# Patient Record
Sex: FEMALE | Race: WHITE | NOT HISPANIC OR LATINO | Employment: OTHER | RURAL
[De-identification: names, ages, dates, MRNs, and addresses within clinical notes are randomized per-mention and may not be internally consistent; named-entity substitution may affect disease eponyms.]

---

## 2024-03-16 ENCOUNTER — OUTSIDE PLACE OF SERVICE (OUTPATIENT)
Dept: ORTHOPEDICS | Facility: CLINIC | Age: 69
End: 2024-03-16
Payer: MEDICARE

## 2024-03-16 PROCEDURE — 25607 OPTX DST RD XARTC FX/EPI SEP: CPT | Mod: RT,,, | Performed by: ORTHOPAEDIC SURGERY

## 2024-03-16 PROCEDURE — 24685 OPTX ULNAR FX PROX END W/FIX: CPT | Mod: 51,RT,, | Performed by: ORTHOPAEDIC SURGERY

## 2024-03-19 ENCOUNTER — OFFICE VISIT (OUTPATIENT)
Dept: ORTHOPEDICS | Facility: CLINIC | Age: 69
End: 2024-03-19
Payer: MEDICARE

## 2024-03-19 DIAGNOSIS — Z09 FOLLOW-UP EXAMINATION, FOLLOWING OTHER SURGERY: Primary | ICD-10-CM

## 2024-03-19 PROCEDURE — 99212 OFFICE O/P EST SF 10 MIN: CPT | Mod: PBBFAC,25 | Performed by: ORTHOPAEDIC SURGERY

## 2024-03-19 PROCEDURE — 29105 APPLICATION LONG ARM SPLINT: CPT | Mod: PBBFAC | Performed by: ORTHOPAEDIC SURGERY

## 2024-03-19 PROCEDURE — 99999PBSHW PR PBB SHADOW TECHNICAL ONLY FILED TO HB: Mod: PBBFAC,,,

## 2024-03-19 PROCEDURE — 99024 POSTOP FOLLOW-UP VISIT: CPT | Mod: ,,, | Performed by: ORTHOPAEDIC SURGERY

## 2024-03-19 PROCEDURE — 29105 APPLICATION LONG ARM SPLINT: CPT | Mod: S$PBB,58,RT, | Performed by: ORTHOPAEDIC SURGERY

## 2024-03-19 RX ORDER — TRAMADOL HYDROCHLORIDE 50 MG/1
50 TABLET ORAL EVERY 6 HOURS PRN
Qty: 20 TABLET | Refills: 0 | Status: ON HOLD | OUTPATIENT
Start: 2024-03-19 | End: 2024-04-10 | Stop reason: HOSPADM

## 2024-03-19 NOTE — PROGRESS NOTES
CC:   Chief Complaint   Patient presents with    Post-op Evaluation     RT WRIST ORIF ELBOW AND WRIST 3/16- NETTA - DRESSING CHANGE         PREVIOUS INFO:  ORIF right olecranon March 16, 2024  ORIF right distal radius March 16, 2024        HISTORY:   3/19/2024    Catalina Pendleton  is a 68 y.o. patient was discharged yesterday from [facility] she underwent ORIF of her olecranon and her wrist on her right arm on the 16th of March think that is we pull the drain she had some drainage in his soaked through the splint on the bottom side she called today so we brought her in to get a change to check her skin she has underlying hepatitis-C history      PAST MEDICAL HISTORY: No past medical history on file.       PAST SURGICAL HISTORY: No past surgical history on file.       ALLERGIES: Review of patient's allergies indicates:  Not on File     MEDICATIONS :  No current outpatient medications on file.     SOCIAL HISTORY:   Social History     Socioeconomic History    Marital status:         ROS    FAMILY HISTORY: No family history on file.       PHYSICAL EXAM: There were no vitals filed for this visit.            There is no height or weight on file to calculate BMI.     In general, this is a well-developed, well-nourished female . The patient is alert, oriented and cooperative.      HEENT:  Normocephalic, atraumatic.  Extraocular movements are intact bilaterally.  The oropharynx is benign.       NECK:  Nontender with good range of motion.      PULMONARY: Respirations are even and non-labored.       CARDIOVASCULAR: Pulses regular by peripheral palpation.       ABDOMEN:  Soft, non-tender, non-distended.        EXTREMITIES:  There was blood-tinged serous fluid on her splints splints removed both incisions look fine she has multiple skin tears lot of swelling with the previous drain is there is blade serous drainage from that also    Ortho Exam      RADIOGRAPHIC FINDINGS:  None  today      .      IMPRESSION:  Lot of swelling and edema skin tears skin is in very poor condition but incisions look good    PLAN:  New dressings new long-arm plaster splint applied I will see her back in a week do a new splint again check on her skin tears also        No follow-ups on file.         Aníbal Fragoso III      (Subject to voice recognition error, transcription service not allowed)

## 2024-03-26 ENCOUNTER — OFFICE VISIT (OUTPATIENT)
Dept: ORTHOPEDICS | Facility: CLINIC | Age: 69
End: 2024-03-26
Payer: MEDICARE

## 2024-03-26 DIAGNOSIS — Z09 FOLLOW-UP EXAMINATION, FOLLOWING OTHER SURGERY: Primary | ICD-10-CM

## 2024-03-26 PROCEDURE — 99024 POSTOP FOLLOW-UP VISIT: CPT | Mod: ,,, | Performed by: ORTHOPAEDIC SURGERY

## 2024-03-26 PROCEDURE — 29105 APPLICATION LONG ARM SPLINT: CPT | Mod: PBBFAC | Performed by: ORTHOPAEDIC SURGERY

## 2024-03-26 PROCEDURE — 29105 APPLICATION LONG ARM SPLINT: CPT | Mod: S$PBB,58,RT, | Performed by: ORTHOPAEDIC SURGERY

## 2024-03-26 PROCEDURE — 99999PBSHW PR PBB SHADOW TECHNICAL ONLY FILED TO HB: Mod: PBBFAC,,,

## 2024-03-26 PROCEDURE — 99212 OFFICE O/P EST SF 10 MIN: CPT | Mod: PBBFAC,25 | Performed by: ORTHOPAEDIC SURGERY

## 2024-03-26 RX ORDER — SULFAMETHOXAZOLE AND TRIMETHOPRIM 800; 160 MG/1; MG/1
1 TABLET ORAL 2 TIMES DAILY
Qty: 14 TABLET | Refills: 0 | Status: SHIPPED | OUTPATIENT
Start: 2024-03-26 | End: 2024-04-02 | Stop reason: SDUPTHER

## 2024-03-26 NOTE — PROGRESS NOTES
CC:   Chief Complaint   Patient presents with    Right Wrist - Post-op Evaluation     RT WRIST AND ELBOW ORIF [place] 3/16 (10 DAYS)    Right Elbow - Post-op Evaluation          PREVIOUS INFO:  ORIF right olecranon March 16, 2024  ORIF right distal radius March 16, 2024  hepatitis-C history        HISTORY:   3/19/2024    Catalina Pendleton  is a 68 y.o. patient was discharged yesterday from [facility] she underwent ORIF of her olecranon and her wrist on her right arm on the 16th of March think that is we pull the drain she had some drainage in his soaked through the splint on the bottom side she called today so we brought her in to get a change to check her skin she has underlying hepatitis-C history         HISTORY:   3/26/2024    Catalina Pendleton  is a 68 y.o. patient comes in follow-up ORIF right olecranon and right wrist volar plate surgery was March 16 she had a lot of swelling lot of weeping she has some skin tears on the volar aspect of her forearm really bone in just for a dressing change she would week through her original dressing we changes on the 19 so she is brought back at a week for dressing change      PAST MEDICAL HISTORY: No past medical history on file.       PAST SURGICAL HISTORY: No past surgical history on file.       ALLERGIES:   Review of patient's allergies indicates:   Allergen Reactions    Latex, natural rubber         MEDICATIONS :    Current Outpatient Medications:     traMADoL (ULTRAM) 50 mg tablet, Take 1 tablet (50 mg total) by mouth every 6 (six) hours as needed for Pain., Disp: 20 tablet, Rfl: 0     SOCIAL HISTORY:   Social History     Socioeconomic History    Marital status:         ROS    FAMILY HISTORY: No family history on file.       PHYSICAL EXAM: There were no vitals filed for this visit.            There is no height or weight on file to calculate BMI.     In general, this is a well-developed, well-nourished female . The patient is alert, oriented and  cooperative.      HEENT:  Normocephalic, atraumatic.  Extraocular movements are intact bilaterally.  The oropharynx is benign.       NECK:  Nontender with good range of motion.      PULMONARY: Respirations are even and non-labored.       CARDIOVASCULAR: Pulses regular by peripheral palpation.       ABDOMEN:  Soft, non-tender, non-distended.        EXTREMITIES:  Dressings are removed splints removed the elbow incision looks excellent she has a skin tear ulna volar aspect of her forearm she does have some swelling about her volar incision on her wrist some erythema present there in addition there is no drainage    Ortho Exam      RADIOGRAPHIC FINDINGS:  None today      .      IMPRESSION:  New sterile dressing new long-arm splint    PLAN:  Long-arm splint out of plaster well-padded sterile dressings  I will place her on Bactrim  And will do a wound check in a week again  Going to place Telfa over her skin tear  There are no Patient Instructions on file for this visit.      No follow-ups on file.         Aníbal Fragoso III      (Subject to voice recognition error, transcription service not allowed)

## 2024-04-01 DIAGNOSIS — M25.531 RIGHT WRIST PAIN: ICD-10-CM

## 2024-04-01 DIAGNOSIS — M25.521 ELBOW PAIN, RIGHT: Primary | ICD-10-CM

## 2024-04-02 ENCOUNTER — OFFICE VISIT (OUTPATIENT)
Dept: ORTHOPEDICS | Facility: CLINIC | Age: 69
End: 2024-04-02
Payer: MEDICARE

## 2024-04-02 ENCOUNTER — HOSPITAL ENCOUNTER (OUTPATIENT)
Dept: RADIOLOGY | Facility: HOSPITAL | Age: 69
Discharge: HOME | End: 2024-04-02
Attending: ORTHOPAEDIC SURGERY
Payer: MEDICARE

## 2024-04-02 DIAGNOSIS — M25.521 ELBOW PAIN, RIGHT: ICD-10-CM

## 2024-04-02 DIAGNOSIS — M25.531 RIGHT WRIST PAIN: ICD-10-CM

## 2024-04-02 DIAGNOSIS — Z09 FOLLOW-UP EXAMINATION, FOLLOWING OTHER SURGERY: Primary | ICD-10-CM

## 2024-04-02 PROCEDURE — 29105 APPLICATION LONG ARM SPLINT: CPT | Mod: PBBFAC | Performed by: ORTHOPAEDIC SURGERY

## 2024-04-02 PROCEDURE — 73110 X-RAY EXAM OF WRIST: CPT | Mod: 26,RT,, | Performed by: ORTHOPAEDIC SURGERY

## 2024-04-02 PROCEDURE — 99212 OFFICE O/P EST SF 10 MIN: CPT | Mod: PBBFAC,25 | Performed by: ORTHOPAEDIC SURGERY

## 2024-04-02 PROCEDURE — 29105 APPLICATION LONG ARM SPLINT: CPT | Mod: S$PBB,58,RT, | Performed by: ORTHOPAEDIC SURGERY

## 2024-04-02 PROCEDURE — 99024 POSTOP FOLLOW-UP VISIT: CPT | Mod: ,,, | Performed by: ORTHOPAEDIC SURGERY

## 2024-04-02 PROCEDURE — 73110 X-RAY EXAM OF WRIST: CPT | Mod: TC,RT

## 2024-04-02 PROCEDURE — 73070 X-RAY EXAM OF ELBOW: CPT | Mod: 26,RT,, | Performed by: ORTHOPAEDIC SURGERY

## 2024-04-02 PROCEDURE — 99999PBSHW PR PBB SHADOW TECHNICAL ONLY FILED TO HB: Mod: PBBFAC,,,

## 2024-04-02 PROCEDURE — 73070 X-RAY EXAM OF ELBOW: CPT | Mod: TC,RT

## 2024-04-02 RX ORDER — SULFAMETHOXAZOLE AND TRIMETHOPRIM 800; 160 MG/1; MG/1
1 TABLET ORAL 2 TIMES DAILY
Qty: 14 TABLET | Refills: 0 | Status: ON HOLD | OUTPATIENT
Start: 2024-04-02 | End: 2024-04-10 | Stop reason: HOSPADM

## 2024-04-09 ENCOUNTER — OFFICE VISIT (OUTPATIENT)
Dept: ORTHOPEDICS | Facility: CLINIC | Age: 69
End: 2024-04-09
Payer: MEDICARE

## 2024-04-09 ENCOUNTER — HOSPITAL ENCOUNTER (OUTPATIENT)
Dept: RADIOLOGY | Facility: HOSPITAL | Age: 69
Discharge: HOME | End: 2024-04-09
Attending: ORTHOPAEDIC SURGERY
Payer: MEDICARE

## 2024-04-09 DIAGNOSIS — Z01.812 PRE-OPERATIVE LABORATORY EXAMINATION: ICD-10-CM

## 2024-04-09 DIAGNOSIS — M25.531 RIGHT WRIST PAIN: Primary | ICD-10-CM

## 2024-04-09 DIAGNOSIS — M79.5 FOREIGN BODY (FB) IN SOFT TISSUE: ICD-10-CM

## 2024-04-09 DIAGNOSIS — M25.531 RIGHT WRIST PAIN: ICD-10-CM

## 2024-04-09 PROCEDURE — 1159F MED LIST DOCD IN RCRD: CPT | Mod: CPTII,,, | Performed by: ORTHOPAEDIC SURGERY

## 2024-04-09 PROCEDURE — 73110 X-RAY EXAM OF WRIST: CPT | Mod: 26,RT,, | Performed by: ORTHOPAEDIC SURGERY

## 2024-04-09 PROCEDURE — 99214 OFFICE O/P EST MOD 30 MIN: CPT | Mod: PBBFAC,25 | Performed by: ORTHOPAEDIC SURGERY

## 2024-04-09 PROCEDURE — 99024 POSTOP FOLLOW-UP VISIT: CPT | Mod: ,,, | Performed by: ORTHOPAEDIC SURGERY

## 2024-04-09 PROCEDURE — 73110 X-RAY EXAM OF WRIST: CPT | Mod: TC,RT

## 2024-04-09 NOTE — PATIENT INSTRUCTIONS
Surgery Instructions     Your surgery is scheduled for 4/10/24 at [facility] on the   ground floor of the Ambulatory building. You should arrive at 6:00 AM  at the   Ambulatory Care Center located at [address].    Pre Op Testing: TODAY     _X___ Lab  (1st floor clinic)   ____ EKG  (2nd floor clinic)  ____ Chest xray (Imaging Center)       Our office will contact you the day before surgery with your arrival time.  DO NOT eat or drink anything after midnight the night before surgery (this includes gum, candy, chewing tobacco, etc).  You CAN NOT drive after surgery, please arrange for someone to drive you.  Bring all medication in their original bottles.  Bathe with Hibiclens the night or morning before your surgery.  The morning of your surgery ONLY take blood pressure, heart, acid reflux, or thyroid (if you take a morning dose) medication with a sip of water.   Be sure to have stopped your blood thinner medication at the appropriate time, as instructed.  Bring your C-Pap machine if you have one.  All jewelry, piercings, or false eyelashes MUST be removed prior to surgery.

## 2024-04-09 NOTE — PROGRESS NOTES
CC:   Chief Complaint   Patient presents with    Post-op Evaluation     RT WRIST & ELBOW ORIF 3/16 (3WKS) NETTA        PREVIOUS INFO:  PREVIOUS INFO:  ORIF right olecranon March 16, 2024  ORIF right distal radius March 16, 2024  hepatitis-C history           History:  4/2/2024   Catalina Pendleton is a 68 y.o.  status post 2 weeks out right upper extremity ORIF of the wrist and olecranon surgery was on 03/16/2024 she had all kind of problems with bruising swelling weeping edema she has skin tears also were basically doing wound care.  We did place her on Bactrim last week secondary to really her the volar aspect of the wrist      HISTORY:   4/9/2024    Catalina Pendleton  is a 68 y.o. patient comes in about 3 and half weeks out ORIF of the olecranon and radius.  The read on antibiotics cause the erythema distally she had skin tears of the heel back but she also healed over a couple of her staples at her wrist.  Remainder of the staples were removed but she has to under the skin going to take out in the operating      PAST MEDICAL HISTORY: No past medical history on file.       PAST SURGICAL HISTORY: No past surgical history on file.       ALLERGIES:   Review of patient's allergies indicates:   Allergen Reactions    Latex, natural rubber         MEDICATIONS :    Current Outpatient Medications:     sulfamethoxazole-trimethoprim 800-160mg (BACTRIM DS) 800-160 mg Tab, Take 1 tablet by mouth 2 (two) times daily., Disp: 14 tablet, Rfl: 0    traMADoL (ULTRAM) 50 mg tablet, Take 1 tablet (50 mg total) by mouth every 6 (six) hours as needed for Pain., Disp: 20 tablet, Rfl: 0     SOCIAL HISTORY:   Social History     Socioeconomic History    Marital status:         ROS    FAMILY HISTORY: No family history on file.       PHYSICAL EXAM: There were no vitals filed for this visit.            There is no height or weight on file to calculate BMI.     In general, this is a well-developed, well-nourished female  . The patient is alert, oriented and cooperative.      HEENT:  Normocephalic, atraumatic.  Extraocular movements are intact bilaterally.  The oropharynx is benign.       NECK:  Nontender with good range of motion.      PULMONARY: Respirations are even and non-labored.       CARDIOVASCULAR: Pulses regular by peripheral palpation.       ABDOMEN:  Soft, non-tender, non-distended.        EXTREMITIES:  On exam all the staples to remove remove those to her covered up totally and skin recommend removing those foreign bodies in the operating room    Ortho Exam      RADIOGRAPHIC FINDINGS:  Right Wrist x-rays AP lateral oblique views volar plate is present good alignment distal radius fracture there is 2 staples present remainder of been removed  .      IMPRESSION:  2 staples are totally buried under the skin skin  ( foreign body) is totally grown over and recommended removal in the operating room this point    PLAN:  Removal foreign bodies staples right wrist we will put her back in a long-arm splint then sling today    I had a long discussion with the patient about treatment options, including operative and nonoperative treatments. We discussed pros and cons of each including risks pertinent to surgery including pain, infection, bleeding, damage to adjacent structures like nerves and blood vessels, failure to heal, need for future surgeries, stiffness, instability, loss of limb, anesthesia risks like stroke, blood clot, loss of life. We discussed the possibility of need for later hardware removal in the case that hardware was used. We discussed common and uncommon risks, and discussed patient specific factors that may increase the risks present with surgery. All questions were answered. The patient expressed understanding of the pros and cons of surgery and wanted to proceed with surgical treatment.                 Aníbal Fragoso III      (Subject to voice recognition error, transcription service not allowed)

## 2024-04-10 ENCOUNTER — HOSPITAL ENCOUNTER (OUTPATIENT)
Facility: HOSPITAL | Age: 69
Discharge: HOME | End: 2024-04-10
Attending: ORTHOPAEDIC SURGERY | Admitting: ORTHOPAEDIC SURGERY
Payer: MEDICARE

## 2024-04-10 ENCOUNTER — ANESTHESIA (OUTPATIENT)
Dept: SURGERY | Facility: HOSPITAL | Age: 69
End: 2024-04-10
Payer: MEDICARE

## 2024-04-10 ENCOUNTER — ANESTHESIA EVENT (OUTPATIENT)
Dept: SURGERY | Facility: HOSPITAL | Age: 69
End: 2024-04-10
Payer: MEDICARE

## 2024-04-10 VITALS
WEIGHT: 160 LBS | HEIGHT: 67 IN | SYSTOLIC BLOOD PRESSURE: 138 MMHG | RESPIRATION RATE: 18 BRPM | OXYGEN SATURATION: 94 % | DIASTOLIC BLOOD PRESSURE: 68 MMHG | BODY MASS INDEX: 25.11 KG/M2 | TEMPERATURE: 98 F | HEART RATE: 103 BPM

## 2024-04-10 DIAGNOSIS — M79.5 FOREIGN BODY (FB) IN SOFT TISSUE: Primary | ICD-10-CM

## 2024-04-10 PROCEDURE — 36000706: Performed by: ORTHOPAEDIC SURGERY

## 2024-04-10 PROCEDURE — 25000003 PHARM REV CODE 250: Performed by: NURSE ANESTHETIST, CERTIFIED REGISTERED

## 2024-04-10 PROCEDURE — 27000716 HC OXISENSOR PROBE, ANY SIZE: Performed by: NURSE ANESTHETIST, CERTIFIED REGISTERED

## 2024-04-10 PROCEDURE — 27000510 HC BLANKET BAIR HUGGER ANY SIZE: Performed by: NURSE ANESTHETIST, CERTIFIED REGISTERED

## 2024-04-10 PROCEDURE — 37000008 HC ANESTHESIA 1ST 15 MINUTES: Performed by: ORTHOPAEDIC SURGERY

## 2024-04-10 PROCEDURE — 25000003 PHARM REV CODE 250: Performed by: ORTHOPAEDIC SURGERY

## 2024-04-10 PROCEDURE — 27000655: Performed by: NURSE ANESTHETIST, CERTIFIED REGISTERED

## 2024-04-10 PROCEDURE — 37000009 HC ANESTHESIA EA ADD 15 MINS: Performed by: ORTHOPAEDIC SURGERY

## 2024-04-10 PROCEDURE — 10120 INC&RMVL FB SUBQ TISS SMPL: CPT | Mod: 78,,, | Performed by: ORTHOPAEDIC SURGERY

## 2024-04-10 PROCEDURE — 71000033 HC RECOVERY, INTIAL HOUR: Performed by: ORTHOPAEDIC SURGERY

## 2024-04-10 PROCEDURE — 71000015 HC POSTOP RECOV 1ST HR: Performed by: ORTHOPAEDIC SURGERY

## 2024-04-10 PROCEDURE — D9220A PRA ANESTHESIA: Mod: ANES,,, | Performed by: ANESTHESIOLOGY

## 2024-04-10 PROCEDURE — 63600175 PHARM REV CODE 636 W HCPCS: Performed by: ANESTHESIOLOGY

## 2024-04-10 PROCEDURE — 63600175 PHARM REV CODE 636 W HCPCS: Performed by: NURSE ANESTHETIST, CERTIFIED REGISTERED

## 2024-04-10 PROCEDURE — 36000707: Performed by: ORTHOPAEDIC SURGERY

## 2024-04-10 PROCEDURE — 27000284 HC CANNULA NASAL: Performed by: NURSE ANESTHETIST, CERTIFIED REGISTERED

## 2024-04-10 PROCEDURE — 27000177 HC AIRWAY, LARYNGEAL MASK: Performed by: NURSE ANESTHETIST, CERTIFIED REGISTERED

## 2024-04-10 PROCEDURE — D9220A PRA ANESTHESIA: Mod: CRNA,,, | Performed by: NURSE ANESTHETIST, CERTIFIED REGISTERED

## 2024-04-10 RX ORDER — MONTELUKAST SODIUM 10 MG/1
10 TABLET ORAL 2 TIMES DAILY
COMMUNITY

## 2024-04-10 RX ORDER — MEPERIDINE HYDROCHLORIDE 25 MG/ML
25 INJECTION INTRAMUSCULAR; INTRAVENOUS; SUBCUTANEOUS EVERY 10 MIN PRN
Status: DISCONTINUED | OUTPATIENT
Start: 2024-04-10 | End: 2024-04-10 | Stop reason: HOSPADM

## 2024-04-10 RX ORDER — FLUTICASONE FUROATE AND VILANTEROL TRIFENATATE 200; 25 UG/1; UG/1
1 POWDER RESPIRATORY (INHALATION) DAILY
COMMUNITY

## 2024-04-10 RX ORDER — SODIUM CHLORIDE 9 MG/ML
INJECTION, SOLUTION INTRAVENOUS CONTINUOUS
Status: DISCONTINUED | OUTPATIENT
Start: 2024-04-10 | End: 2024-04-10 | Stop reason: HOSPADM

## 2024-04-10 RX ORDER — DIPHENHYDRAMINE HYDROCHLORIDE 50 MG/ML
25 INJECTION INTRAMUSCULAR; INTRAVENOUS EVERY 6 HOURS PRN
Status: DISCONTINUED | OUTPATIENT
Start: 2024-04-10 | End: 2024-04-10 | Stop reason: HOSPADM

## 2024-04-10 RX ORDER — PROPOFOL 10 MG/ML
INJECTION, EMULSION INTRAVENOUS
Status: DISCONTINUED | OUTPATIENT
Start: 2024-04-10 | End: 2024-04-10

## 2024-04-10 RX ORDER — DEXAMETHASONE SODIUM PHOSPHATE 4 MG/ML
INJECTION, SOLUTION INTRA-ARTICULAR; INTRALESIONAL; INTRAMUSCULAR; INTRAVENOUS; SOFT TISSUE
Status: DISCONTINUED | OUTPATIENT
Start: 2024-04-10 | End: 2024-04-10

## 2024-04-10 RX ORDER — IPRATROPIUM BROMIDE AND ALBUTEROL SULFATE 2.5; .5 MG/3ML; MG/3ML
3 SOLUTION RESPIRATORY (INHALATION) ONCE
Status: DISCONTINUED | OUTPATIENT
Start: 2024-04-10 | End: 2024-04-10 | Stop reason: HOSPADM

## 2024-04-10 RX ORDER — ONDANSETRON 4 MG/1
8 TABLET, ORALLY DISINTEGRATING ORAL EVERY 8 HOURS PRN
Status: DISCONTINUED | OUTPATIENT
Start: 2024-04-10 | End: 2024-04-10 | Stop reason: HOSPADM

## 2024-04-10 RX ORDER — TRAMADOL HYDROCHLORIDE 50 MG/1
50 TABLET ORAL EVERY 6 HOURS PRN
Qty: 12 TABLET | Refills: 0 | Status: SHIPPED | OUTPATIENT
Start: 2024-04-10

## 2024-04-10 RX ORDER — HYDROMORPHONE HYDROCHLORIDE 2 MG/ML
0.5 INJECTION, SOLUTION INTRAMUSCULAR; INTRAVENOUS; SUBCUTANEOUS EVERY 5 MIN PRN
Status: DISCONTINUED | OUTPATIENT
Start: 2024-04-10 | End: 2024-04-10 | Stop reason: HOSPADM

## 2024-04-10 RX ORDER — OMEPRAZOLE 40 MG/1
40 CAPSULE, DELAYED RELEASE ORAL DAILY
COMMUNITY
Start: 2024-01-31

## 2024-04-10 RX ORDER — MORPHINE SULFATE 10 MG/ML
4 INJECTION INTRAMUSCULAR; INTRAVENOUS; SUBCUTANEOUS EVERY 5 MIN PRN
Status: DISCONTINUED | OUTPATIENT
Start: 2024-04-10 | End: 2024-04-10 | Stop reason: HOSPADM

## 2024-04-10 RX ORDER — TRAMADOL HYDROCHLORIDE 50 MG/1
50 TABLET ORAL EVERY 4 HOURS PRN
Status: DISCONTINUED | OUTPATIENT
Start: 2024-04-10 | End: 2024-04-10 | Stop reason: HOSPADM

## 2024-04-10 RX ORDER — ONDANSETRON HYDROCHLORIDE 2 MG/ML
INJECTION, SOLUTION INTRAVENOUS
Status: DISCONTINUED | OUTPATIENT
Start: 2024-04-10 | End: 2024-04-10

## 2024-04-10 RX ORDER — ONDANSETRON HYDROCHLORIDE 2 MG/ML
4 INJECTION, SOLUTION INTRAVENOUS DAILY PRN
Status: DISCONTINUED | OUTPATIENT
Start: 2024-04-10 | End: 2024-04-10 | Stop reason: HOSPADM

## 2024-04-10 RX ORDER — LIDOCAINE HYDROCHLORIDE 20 MG/ML
INJECTION, SOLUTION EPIDURAL; INFILTRATION; INTRACAUDAL; PERINEURAL
Status: DISCONTINUED | OUTPATIENT
Start: 2024-04-10 | End: 2024-04-10

## 2024-04-10 RX ORDER — FENTANYL CITRATE 50 UG/ML
INJECTION, SOLUTION INTRAMUSCULAR; INTRAVENOUS
Status: DISCONTINUED | OUTPATIENT
Start: 2024-04-10 | End: 2024-04-10

## 2024-04-10 RX ADMIN — SODIUM CHLORIDE: 9 INJECTION, SOLUTION INTRAVENOUS at 09:04

## 2024-04-10 RX ADMIN — LIDOCAINE HYDROCHLORIDE 60 MG: 20 INJECTION, SOLUTION INTRAVENOUS at 09:04

## 2024-04-10 RX ADMIN — HYDROMORPHONE HYDROCHLORIDE 0.5 MG: 2 INJECTION INTRAMUSCULAR; INTRAVENOUS; SUBCUTANEOUS at 10:04

## 2024-04-10 RX ADMIN — DEXAMETHASONE SODIUM PHOSPHATE 6 MG: 4 INJECTION, SOLUTION INTRA-ARTICULAR; INTRALESIONAL; INTRAMUSCULAR; INTRAVENOUS; SOFT TISSUE at 09:04

## 2024-04-10 RX ADMIN — ONDANSETRON 4 MG: 2 INJECTION INTRAMUSCULAR; INTRAVENOUS at 09:04

## 2024-04-10 RX ADMIN — FENTANYL CITRATE 25 MCG: 50 INJECTION INTRAMUSCULAR; INTRAVENOUS at 09:04

## 2024-04-10 RX ADMIN — PROPOFOL 130 MG: 10 INJECTION, EMULSION INTRAVENOUS at 09:04

## 2024-04-10 RX ADMIN — FENTANYL CITRATE 50 MCG: 50 INJECTION INTRAMUSCULAR; INTRAVENOUS at 09:04

## 2024-04-10 NOTE — ANESTHESIA PREPROCEDURE EVALUATION
04/10/2024  Catalina Pendleton is a 68 y.o., female.      Pre-op Assessment    I have reviewed the Patient Summary Reports.     I have reviewed the Nursing Notes. I have reviewed the NPO Status.   I have reviewed the Medications.     Review of Systems  Anesthesia Hx:             Denies Family Hx of Anesthesia complications.    Denies Personal Hx of Anesthesia complications.                    Social:  Non-Smoker, No Alcohol Use       Pulmonary:   COPD         Chronic Obstructive Pulmonary Disease (COPD):                      Musculoskeletal:  Musculoskeletal Normal                    Physical Exam  General: Well nourished, Cooperative, Alert and Oriented    Airway:  Mallampati: II / II  Mouth Opening: Normal  TM Distance: Normal  Neck ROM: Normal ROM    Dental:  Intact    Chest/Lungs:  Clear to auscultation    Heart:  Rate: Normal  Rhythm: Regular Rhythm  Sounds: Normal        Chemistry        Component Value Date/Time     04/09/2024 1126    K 4.4 04/09/2024 1126     04/09/2024 1126    CO2 26 04/09/2024 1126    BUN 19 (H) 04/09/2024 1126    CREATININE 1.22 (H) 04/09/2024 1126     (H) 04/09/2024 1126        Component Value Date/Time    CALCIUM 9.1 04/09/2024 1126    ALKPHOS 100 04/09/2024 1126    AST 24 04/09/2024 1126    ALT 24 04/09/2024 1126    BILITOT 0.4 04/09/2024 1126        Lab Results   Component Value Date    WBC 4.84 04/09/2024    HGB 10.8 (L) 04/09/2024    HCT 36.8 (L) 04/09/2024     04/09/2024     No results found for this or any previous visit.      Anesthesia Plan  Type of Anesthesia, risks & benefits discussed:    Anesthesia Type: Gen Supraglottic Airway  Intra-op Monitoring Plan: Standard ASA Monitors  Post Op Pain Control Plan: multimodal analgesia  Induction:  IV  Airway Plan: Direct  Informed Consent: Informed consent signed with the Patient and all parties  understand the risks and agree with anesthesia plan.  All questions answered.   ASA Score: 2  Day of Surgery Review of History & Physical: H&P Update referred to the surgeon/provider.I have interviewed and examined the patient. I have reviewed the patient's H&P dated: There are no significant changes.     Ready For Surgery From Anesthesia Perspective.     .

## 2024-04-10 NOTE — TRANSFER OF CARE
"Anesthesia Transfer of Care Note    Patient: Catalina Pendleton    Procedure(s) Performed: Procedure(s) (LRB):  REMOVAL, FOREIGN BODY (STAPLES), UPPER EXTREMITY (Right)    Patient location: PACU    Anesthesia Type: general    Transport from OR: Transported from OR on room air with adequate spontaneous ventilation    Post pain: adequate analgesia    Post assessment: no apparent anesthetic complications and tolerated procedure well    Post vital signs: stable    Level of consciousness: alert, awake and oriented    Nausea/Vomiting: no nausea/vomiting    Complications: none    Transfer of care protocol was followed      Last vitals: Visit Vitals  BP (!) 153/63   Pulse 89   Temp 36.7 °C (98 °F) (Oral)   Resp 11   Ht 5' 7" (1.702 m)   Wt 72.6 kg (160 lb)   SpO2 99%   Breastfeeding No   BMI 25.06 kg/m²     "

## 2024-04-10 NOTE — BRIEF OP NOTE
[facility] - Orthopedic Periop Services  Brief Operative Note     Surgery Date: 4/10/2024     Pre-op Diagnosis:   Right wrist foreign body x2    Post-op Diagnosis:  Same    Procedure:  Removal of foreign body right wrist time to    IMPLANTS:  * No implants in log *    Surgeon: Aníbal Fragoso III, MD     Assisting Surgeon:  Rober    Anesthesia:  General    EBL:  Less than 5 cc    COMPLICATION:  none     Specimens:   Specimen (24h ago, onward)      None              Discharge Note    OUTCOME: Patient tolerated treatment/procedure well without complication and is now ready for discharge.    DISPOSITION: Home or Self Care    FINAL DIAGNOSIS:  <principal problem not specified>    FOLLOWUP: In clinic    DISCHARGE INSTRUCTIONS:    Discharge Procedure Orders   Diet general     Call MD for:  temperature >100.4     Call MD for:  redness, tenderness, or signs of infection (pain, swelling, redness, odor or green/yellow discharge around incision site)     Leave dressing on - Keep it clean, dry, and intact until clinic visit         Aníbal Fragoso III

## 2024-04-10 NOTE — ANESTHESIA PROCEDURE NOTES
Intubation    Date/Time: 4/10/2024 9:27 AM    Performed by: Mohamud Flores Jr., CRNA  Authorized by: Gregory Red MD    Intubation:     Induction:  Intravenous    Intubated:  Postinduction    Mask Ventilation:  Easy mask    Attempted By:  CRNA    Method of Intubation:  Direct    Difficult Airway Encountered?: No      Complications:  None    Airway Device:  Supraglottic airway/LMA (Igel)    Airway Device Size:  4.0    Style/Cuff Inflation:  Uncuffed    Secured at:  The lips    Placement Verified By:  Capnometry    Complicating Factors:  None    Findings Post-Intubation:  Atraumatic/condition of teeth unchanged

## 2024-04-10 NOTE — OR NURSING
1007 Rec'd pt to PACU awake and alert with no distress noted, respirations even and unlabored. VSS. Right wrist dressing C/D/I, cap refill less than 3 seconds, arm sling in place. C/o pain, denies other needs. Will continue to monitor.     1016 Pt c/o pain 8/10. IV dilaudid given, see MAR for admin.     1037 Out of PACU. VSS. No signs of bleeding/distress noted.     1040 Pt to ASC 22 awake and alert with no distress noted, respirations even and unlabored. No visitors at bedside. Bedside report given to YOKO Gilliland RN. Right wrist dressing C/D/I with arm sling in place, cap refill less than 3 seconds. States pain improved, denies other needs. /68, P 84, R 16, O2 95% RA.

## 2024-04-10 NOTE — OP NOTE
DEPARTMENT OF ORTHOPEDIC SURGERY               OPERATIVE REPORT      Surgery Date: 4/10/2024     Pre-op Diagnosis:   Right wrist foreign body x2    Post-op Diagnosis:  Same    Procedure:  Removal of foreign body right wrist time to    IMPLANTS:  * No implants in log *    Surgeon: Aníbal Fragoso III, MD     Assisting Surgeon:  Rober    Anesthesia:  General    EBL:  Less than 5 cc    COMPLICATION:  none      INDICATION: Catalina Pendleton is a 68 y.o. Mrs. Ever 2 status post ORIF right olecranon and distal radius on March 16, 2024 she had a lot of 3rd spacing of edema secondary to the trauma to the extremity skin tears etc. and then to the staples just got covered up with skin totally to the point we could not get them out in clinic and had scheduled her for a surgery      Procedure in detail:  Patient was brought to the operating room general anesthetic administered per anesthesia well-padded tourniquet applied right arm was prepped and draped using C-arm guidance with down we are able to remove the 2 staples washed this out loosely approximated sterile dressing and a long-arm well-padded splint                           Aníbal Fragoso III

## 2024-04-12 NOTE — ANESTHESIA POSTPROCEDURE EVALUATION
Anesthesia Post Evaluation    Patient: Catalina Pendleton    Procedure(s) Performed: Procedure(s) (LRB):  REMOVAL, FOREIGN BODY (STAPLES), WRIST (Right)    Final Anesthesia Type: general      Patient location during evaluation: PACU  Patient participation: Yes- Able to Participate  Level of consciousness: awake and alert  Post-procedure vital signs: reviewed and stable  Pain management: adequate  Airway patency: patent  MINI mitigation strategies: Multimodal analgesia  PONV status at discharge: No PONV  Anesthetic complications: no      Cardiovascular status: blood pressure returned to baseline  Respiratory status: unassisted  Hydration status: euvolemic  Follow-up not needed.              Vitals Value Taken Time   /62 04/10/24 1050   Temp 36.7 °C (98 °F) 04/10/24 1115   Pulse 103 04/10/24 1100   Resp 18 04/10/24 1040   SpO2 94 % 04/10/24 1100   Vitals shown include unvalidated device data.      Event Time   Out of Recovery 10:37:00         Pain/Rafiq Score: No data recorded

## 2024-04-24 ENCOUNTER — HOSPITAL ENCOUNTER (OUTPATIENT)
Dept: RADIOLOGY | Facility: HOSPITAL | Age: 69
Discharge: HOME | End: 2024-04-24
Attending: NURSE PRACTITIONER
Payer: MEDICARE

## 2024-04-24 ENCOUNTER — OFFICE VISIT (OUTPATIENT)
Dept: ORTHOPEDICS | Facility: CLINIC | Age: 69
End: 2024-04-24
Payer: MEDICARE

## 2024-04-24 DIAGNOSIS — Z47.89 ORTHOPEDIC AFTERCARE: Primary | ICD-10-CM

## 2024-04-24 DIAGNOSIS — Z47.89 ORTHOPEDIC AFTERCARE: ICD-10-CM

## 2024-04-24 PROCEDURE — 73110 X-RAY EXAM OF WRIST: CPT | Mod: 26,LT,, | Performed by: RADIOLOGY

## 2024-04-24 PROCEDURE — 99213 OFFICE O/P EST LOW 20 MIN: CPT | Mod: PBBFAC,25 | Performed by: NURSE PRACTITIONER

## 2024-04-24 PROCEDURE — 99024 POSTOP FOLLOW-UP VISIT: CPT | Mod: ,,, | Performed by: NURSE PRACTITIONER

## 2024-04-24 PROCEDURE — 73110 X-RAY EXAM OF WRIST: CPT | Mod: TC,LT

## 2024-04-24 PROCEDURE — 99999 PR PBB SHADOW E&M-EST. PATIENT-LVL III: CPT | Mod: PBBFAC,,, | Performed by: NURSE PRACTITIONER

## 2024-04-24 NOTE — PROGRESS NOTES
HISTORY OF PRESENT ILLNESS:       Removal, Foreign Body (staples), Wrist - Right 4/10/2024       Pt is here today for First post-operative followup of her No surgery found.  she is doing well.  We have reviewed her findings and discussed plan of care and future treatment options, including the physical therapy plan.       Patient is 2 weeks postop right wrist foreign body removal, doing well.  No complaints today.  Staples removed and Steri-Strips applied.                                                                             PHYSICAL EXAMINATION:     Incision sites were inspected today.  There is no evidence of any erythema, infection or induration  Minimal effusion is present.  Patient is neurovascularly intact.   2+ radial and ulnar pulse pulses.        Imaging:         X-Ray Wrist 2 View Left    Result Date: 4/10/2024  EXAMINATION: XR WRIST 2 VIEW LEFT CLINICAL HISTORY: Removal of foreign body staples x2; COMPARISON: 9 April 2024 TECHNIQUE: XR WRIST 2 VIEW LEFT FINDINGS: There is previous internal fixation of the fracture. Fracture alignment and hardware appear within normal limits similar to previous.  The skin staples have been removed.     Internal fixation of fracture appears stable.  Removal of skin staples. Electronically signed by: Minh Jaimes Date:    04/10/2024 Time:    10:34                                                                                 ASSESSMENT:                                                                                                                                               1. Status post above, doing well.                                                                                                                               PLAN:       Wound care discussed today.  Return to clinic with Dr. Fragoso in 4 weeks                                                                                                                               There are no Patient  Instructions on file for this visit.

## 2024-05-02 ENCOUNTER — HOSPITAL ENCOUNTER (OUTPATIENT)
Dept: RADIOLOGY | Facility: HOSPITAL | Age: 69
Discharge: HOME | End: 2024-05-02
Attending: ORTHOPAEDIC SURGERY
Payer: MEDICARE

## 2024-05-02 ENCOUNTER — OFFICE VISIT (OUTPATIENT)
Dept: ORTHOPEDICS | Facility: CLINIC | Age: 69
End: 2024-05-02
Payer: MEDICARE

## 2024-05-02 DIAGNOSIS — Z47.89 ORTHOPEDIC AFTERCARE: Primary | ICD-10-CM

## 2024-05-02 DIAGNOSIS — Z47.89 ORTHOPEDIC AFTERCARE: ICD-10-CM

## 2024-05-02 DIAGNOSIS — Z09 FOLLOW-UP EXAMINATION, FOLLOWING OTHER SURGERY: Primary | ICD-10-CM

## 2024-05-02 PROCEDURE — 73110 X-RAY EXAM OF WRIST: CPT | Mod: TC,RT

## 2024-05-02 PROCEDURE — 73070 X-RAY EXAM OF ELBOW: CPT | Mod: 26,RT,, | Performed by: ORTHOPAEDIC SURGERY

## 2024-05-02 PROCEDURE — 73110 X-RAY EXAM OF WRIST: CPT | Mod: 26,RT,, | Performed by: ORTHOPAEDIC SURGERY

## 2024-05-02 PROCEDURE — 99024 POSTOP FOLLOW-UP VISIT: CPT | Mod: ,,, | Performed by: ORTHOPAEDIC SURGERY

## 2024-05-02 PROCEDURE — 73070 X-RAY EXAM OF ELBOW: CPT | Mod: TC,RT

## 2024-05-02 PROCEDURE — 99212 OFFICE O/P EST SF 10 MIN: CPT | Mod: PBBFAC,25 | Performed by: ORTHOPAEDIC SURGERY

## 2024-05-02 NOTE — PROGRESS NOTES
CC:   Chief Complaint   Patient presents with    Follow-up     LT WRIST AND LT ELBOW FX 3/16- 7 WEEKS         PREVIOUS INFO:        HISTORY:   5/2/2024    Catalina Pendleton  is a 68 y.o. 7 weeks out from ORIF of the left wrist and left elbow on 03/16/2024 splints removed both incisions look good today 1 little raw spot on the wrist but no sign of infection      PAST MEDICAL HISTORY:   Past Medical History:   Diagnosis Date    COPD (chronic obstructive pulmonary disease)     Unspecified viral hepatitis C without hepatic coma           PAST SURGICAL HISTORY:   Past Surgical History:   Procedure Laterality Date    REMOVAL OF FOREIGN BODY FROM UPPER EXTREMITY Right 4/10/2024    Procedure: REMOVAL, FOREIGN BODY (STAPLES), WRIST;  Surgeon: Aníbal Fragoso III, MD;  Location: [facility];  Service: Orthopedics;  Laterality: Right;  wrist          ALLERGIES:   Review of patient's allergies indicates:   Allergen Reactions    Latex, natural rubber         MEDICATIONS :    Current Outpatient Medications:     BREO ELLIPTA 200-25 mcg/dose DsDv diskus inhaler, Inhale 1 puff into the lungs once daily., Disp: , Rfl:     montelukast (SINGULAIR) 10 mg tablet, Take 10 mg by mouth 2 (two) times daily., Disp: , Rfl:     omeprazole (PRILOSEC) 40 MG capsule, Take 40 mg by mouth Daily., Disp: , Rfl:     traMADoL (ULTRAM) 50 mg tablet, Take 1 tablet (50 mg total) by mouth every 6 (six) hours as needed for Pain., Disp: 12 tablet, Rfl: 0     SOCIAL HISTORY:   Social History     Socioeconomic History    Marital status:         ROS    FAMILY HISTORY: No family history on file.       PHYSICAL EXAM: There were no vitals filed for this visit.            There is no height or weight on file to calculate BMI.     In general, this is a well-developed, well-nourished female . The patient is alert, oriented and cooperative.      HEENT:  Normocephalic, atraumatic.  Extraocular movements are intact bilaterally.  The  oropharynx is benign.       NECK:  Nontender with good range of motion.      PULMONARY: Respirations are even and non-labored.       CARDIOVASCULAR: Pulses regular by peripheral palpation.       ABDOMEN:  Soft, non-tender, non-distended.        EXTREMITIES:  Elbow has about 30-40 degrees of motion wrist is stiff also both incisions look good 1 little raw spot on the wrist but no sign of infection of the elbow has healed fully    Ortho Exam      RADIOGRAPHIC FINDINGS:  Right elbow two views AP and lateral view previous ORIF of the olecranon  elbow joints congruent reduced fracture appears to be healing well    Right wrist three views AP lateral oblique views volar plate is present progressively healing distal radius fracture good alignment AP lateral oblique views      .      IMPRESSION:  Sling gentle range of motion no picking up pushing lifting etc.    PLAN:  See her back 3 weeks repeat x-rays of each check motion  There are no Patient Instructions on file for this visit.      Follow up in about 3 weeks (around 5/23/2024).         Aníbal Fragoso III      (Subject to voice recognition error, transcription service not allowed)

## 2024-05-22 DIAGNOSIS — Z47.89 ORTHOPEDIC AFTERCARE: Primary | ICD-10-CM

## 2024-05-23 ENCOUNTER — HOSPITAL ENCOUNTER (OUTPATIENT)
Dept: RADIOLOGY | Facility: HOSPITAL | Age: 69
Discharge: HOME | End: 2024-05-23
Attending: ORTHOPAEDIC SURGERY
Payer: MEDICARE

## 2024-05-23 ENCOUNTER — OFFICE VISIT (OUTPATIENT)
Dept: ORTHOPEDICS | Facility: CLINIC | Age: 69
End: 2024-05-23
Payer: MEDICARE

## 2024-05-23 DIAGNOSIS — Z09 FOLLOW-UP EXAMINATION, FOLLOWING OTHER SURGERY: Primary | ICD-10-CM

## 2024-05-23 DIAGNOSIS — Z47.89 ORTHOPEDIC AFTERCARE: ICD-10-CM

## 2024-05-23 PROCEDURE — 99213 OFFICE O/P EST LOW 20 MIN: CPT | Mod: PBBFAC,25 | Performed by: ORTHOPAEDIC SURGERY

## 2024-05-23 PROCEDURE — 1159F MED LIST DOCD IN RCRD: CPT | Mod: CPTII,,, | Performed by: ORTHOPAEDIC SURGERY

## 2024-05-23 PROCEDURE — 73070 X-RAY EXAM OF ELBOW: CPT | Mod: 26,RT,, | Performed by: ORTHOPAEDIC SURGERY

## 2024-05-23 PROCEDURE — 73070 X-RAY EXAM OF ELBOW: CPT | Mod: TC,RT

## 2024-05-23 PROCEDURE — 73110 X-RAY EXAM OF WRIST: CPT | Mod: TC,RT

## 2024-05-23 PROCEDURE — 99024 POSTOP FOLLOW-UP VISIT: CPT | Mod: ,,, | Performed by: ORTHOPAEDIC SURGERY

## 2024-05-23 PROCEDURE — 73110 X-RAY EXAM OF WRIST: CPT | Mod: 26,RT,, | Performed by: ORTHOPAEDIC SURGERY

## 2024-05-23 NOTE — PROGRESS NOTES
CC:    Chief Complaint   Patient presents with    Left Elbow - Injury     RT ELBOW/ WRIST ORIF 3/16- 10 WEEKS - NETTA           Previos History :    HISTORY:   5/2/2024    Catalina Pendleton  is a 68 y.o. 7 weeks out from ORIF of the left wrist and left elbow on 03/16/2024 splints removed both incisions look good today 1 little raw spot on the wrist but no sign of infection         History:  5/23/2024   Catalina Pendleton is a 68 y.o.  status post ten weeks out from ORIF the wrist and elbow right upper extremity        PE:   Moving elbow and wrist excellent lacks a few degrees about 10-15 degrees full extension of the elbow skin is healed well in most      Radiology:  Right elbow AP and lateral views previous ORIF with a posterior plate on the olecranon leg olecranon fracture looks well healed or excellent alignment joints congruent reduced  Right wrist three views AP lateral oblique view previous ORIF of the distal radius with a volar plate progressively healing good alignment distal ulna fracture present in addition progressively healing      Ass/Plan:  Work on motion we are going to see her back basically p.r.n.        Aníbal Fragoso III, MD    Subject to voice recognition errors,  transcription services are not allowed

## 2025-05-12 NOTE — PROGRESS NOTES
CC:    Chief Complaint   Patient presents with    Left Elbow - Injury     RT ELBOW/ WRIST ORIF 3/16- 2 WEEKS - NETTA           Previos History :  PREVIOUS INFO:  ORIF right olecranon March 16, 2024  ORIF right distal radius March 16, 2024  hepatitis-C history         History:  4/2/2024   Catalina Pendleton is a 68 y.o.  status post 2 weeks out right upper extremity ORIF of the wrist and olecranon surgery was on 03/16/2024 she had all kind of problems with bruising swelling weeping edema she has skin tears also were basically doing wound care.  We did place her on Bactrim last week secondary to really her the volar aspect of the wrist      PE:   Splints removed the elbow looks great we will get the staples out today the skin tears totally epithelialized that looks great distally still has some erythema and swelling at her volar wrist incision but it is significantly improved also      Radiology:  Right wrist three views AP lateral oblique views is a volar plate previous ORIF distal radius good alignment staple line present    Right elbow AP and lateral views previous ORIF posterior plate on the olecranon olecranon reduced in excellent alignment joints congruent reduced.  Incomplete healing in a fracture    Ass/Plan:  New well-padded long-arm splint applied told her try to keep the wrist elevated it is the most dependent think that is why she swelled down there so I will continue the antibiotics for 1 more weeks see her back in a week but new long-arm well-padded plaster splint applied follow-up in a week staples removed from the elbow        Aníbal Fragoso III, MD    Subject to voice recognition errors,  transcription services are not  Patient's daughter calling in regards of a prescription that was suppose to be sent since the morning and has not been sent per patient' s daughter. Daughter would like to speak with nurse asap    allowed

## (undated) DEVICE — GLOVE SENSICARE PI GRN 7

## (undated) DEVICE — PADDING WYTEX UNDRCST 2INX4YD

## (undated) DEVICE — PENCIL ELECTROSURG HOLST W/BLD

## (undated) DEVICE — SLING ARM LARGE FOAM STRAP

## (undated) DEVICE — SUT ETHILON 4-0 PS2 18 BLK

## (undated) DEVICE — GLOVE SENSICARE PI GRN 7.5

## (undated) DEVICE — TOURNIQUET SB QC SP 18X4IN

## (undated) DEVICE — SOL NACL IRR 1000ML BTL

## (undated) DEVICE — Device

## (undated) DEVICE — BAG RECTANGLE RBBRBND 30X36IN

## (undated) DEVICE — BANDAGE ESMARK 4INX3YD

## (undated) DEVICE — GLOVE SENSICARE PI SURG 8

## (undated) DEVICE — APPLICATOR CHLORAPREP ORN 26ML

## (undated) DEVICE — GLOVE SENSICARE PI SURG 7.5

## (undated) DEVICE — PAD CURAD NONADH 3X4IN

## (undated) DEVICE — SOCKINETTE DOUBLE PLY 4X48IN

## (undated) DEVICE — GLOVE SENSICARE PI SURG 7

## (undated) DEVICE — GLOVE SENSICARE PI GRN 8

## (undated) DEVICE — SPONGE LAP 18X18 PREWASHED

## (undated) DEVICE — DRAPE THREE-QTR REINF 53X77IN